# Patient Record
Sex: FEMALE | Race: WHITE | NOT HISPANIC OR LATINO | Employment: FULL TIME | URBAN - METROPOLITAN AREA
[De-identification: names, ages, dates, MRNs, and addresses within clinical notes are randomized per-mention and may not be internally consistent; named-entity substitution may affect disease eponyms.]

---

## 2018-04-28 ENCOUNTER — OFFICE VISIT (OUTPATIENT)
Dept: URGENT CARE | Facility: CLINIC | Age: 53
End: 2018-04-28
Payer: COMMERCIAL

## 2018-04-28 VITALS
TEMPERATURE: 98.2 F | BODY MASS INDEX: 44.98 KG/M2 | SYSTOLIC BLOOD PRESSURE: 140 MMHG | HEART RATE: 97 BPM | HEIGHT: 65 IN | OXYGEN SATURATION: 98 % | DIASTOLIC BLOOD PRESSURE: 73 MMHG | RESPIRATION RATE: 18 BRPM | WEIGHT: 270 LBS

## 2018-04-28 DIAGNOSIS — L08.9 FINGER INFECTION: Primary | ICD-10-CM

## 2018-04-28 PROCEDURE — 26010 DRAINAGE OF FINGER ABSCESS: CPT | Performed by: PHYSICIAN ASSISTANT

## 2018-04-28 PROCEDURE — 20610 DRAIN/INJ JOINT/BURSA W/O US: CPT | Performed by: PHYSICIAN ASSISTANT

## 2018-04-28 PROCEDURE — 99203 OFFICE O/P NEW LOW 30 MIN: CPT | Performed by: PHYSICIAN ASSISTANT

## 2018-04-28 RX ORDER — GINSENG 100 MG
1 CAPSULE ORAL ONCE
Status: COMPLETED | OUTPATIENT
Start: 2018-04-28 | End: 2018-04-28

## 2018-04-28 RX ORDER — SULFAMETHOXAZOLE AND TRIMETHOPRIM 800; 160 MG/1; MG/1
1 TABLET ORAL EVERY 12 HOURS SCHEDULED
Qty: 20 TABLET | Refills: 0 | Status: SHIPPED | OUTPATIENT
Start: 2018-04-28 | End: 2018-05-08

## 2018-04-28 RX ORDER — OMEPRAZOLE 20 MG/1
20 CAPSULE, DELAYED RELEASE ORAL DAILY
COMMUNITY

## 2018-04-28 RX ADMIN — Medication 1 SMALL APPLICATION: at 11:24

## 2018-04-28 NOTE — PROGRESS NOTES
[facility]        NAME: Anais Marcelo is a 46 y o  female  : 1965    MRN: [id number]  DATE: 2018  TIME: 11:42 AM    Assessment and Plan   Finger infection [L08 9]  1  Finger infection  bacitracin topical ointment 1 small application    sulfamethoxazole-trimethoprim (BACTRIM DS) 800-160 mg per tablet         Patient Instructions     1  Finger infection  -small abscess was drained with 18 gauge needle  -antibiotic ointment and bandaid was applied  -Do warm soaks 2-3 times a day  -Take Bactrim as directed  -Follow-up with PCP within 5-7 days    Go to ER with worsening symptoms, worsening pain, increased redness, fever, red streaking, or any new concerns      Chief Complaint     Chief Complaint   Patient presents with    Finger Injury     R 2nd digit, cut on 18, small cut from a knife, now painful, red         History of Present Illness       The patient presents today for an evaluation of right 2nd finger pain  The patient states that she cut her finger with a knife on 18  The patient states that it started scab over and kept picking at it  The patient states that on Thursday it started becoming more red and swollen  The patient rates her pain as a 0/10 while at rest but it becomes worse with touching and moving  The patient believes that her tetanus is up to date  Review of Systems   Review of Systems   Constitutional: Negative for chills and fever  Skin: Positive for wound  Neurological: Negative for numbness  Current Medications       Current Outpatient Prescriptions:     omeprazole (PriLOSEC) 20 mg delayed release capsule, Take 20 mg by mouth daily, Disp: , Rfl:     sulfamethoxazole-trimethoprim (BACTRIM DS) 800-160 mg per tablet, Take 1 tablet by mouth every 12 (twelve) hours for 10 days, Disp: 20 tablet, Rfl: 0  No current facility-administered medications for this visit       Current Allergies     Allergies as of 2018    (No Known Allergies) The following portions of the patient's history were reviewed and updated as appropriate: allergies, current medications, past family history, past medical history, past social history, past surgical history and problem list      No past medical history on file  No past surgical history on file  No family history on file  Medications have been verified  Objective   /73   Pulse 97   Temp 98 2 °F (36 8 °C) (Tympanic)   Resp 18   Ht 5' 5" (1 651 m)   Wt 122 kg (270 lb)   LMP  (LMP Unknown)   SpO2 98%   BMI 44 93 kg/m²        Physical Exam     Physical Exam   Constitutional: She is oriented to person, place, and time  She appears well-developed and well-nourished  Musculoskeletal: Normal range of motion  Arms:  Neurological: She is alert and oriented to person, place, and time  She has normal strength  No sensory deficit  Skin: Skin is warm and dry  Psychiatric: She has a normal mood and affect  Nursing note and vitals reviewed  Incision and drain  Date/Time: 4/28/2018 11:44 AM  Performed by: Giselle Quesada by: Glenroy Dawson     Patient location:  Clinic  Consent:     Consent obtained:  Verbal    Consent given by:  Patient  Location:     Type:  Abscess    Location:  Upper extremity    Upper extremity location:  R index finger  Procedure details:     Complexity:  Simple    Incision types:  Stab incision    Scalpel size: 18 gauge needle  Incision depth:  Skin    Drainage:  Purulent    Drainage amount:  Scant  Post-procedure details:     Patient tolerance of procedure:   Tolerated well, no immediate complications  Comments:      Antibiotic ointment and bandaid was applied

## 2018-04-28 NOTE — PATIENT INSTRUCTIONS
1  Finger infection  -small abscess was drained with 18 gauge needle  -antibiotic ointment and bandaid was applied  -Do warm soaks 2-3 times a day  -Take Bactrim as directed  -Follow-up with PCP within 5-7 days    Go to ER with worsening symptoms, worsening pain, increased redness, fever, red streaking, or any new concerns    Abscess   AMBULATORY CARE:   An abscess  is an area under the skin where pus (infected fluid) collects  An abscess is often caused by bacteria, fungi or other germs that get into an open wound  You can get an abscess anywhere on your body  Common signs and symptoms of an abscess: You may have a swollen mass that is red and painful  Pus may leak out of the mass  The pus will be white or yellow and may smell bad  You may have redness and pain days before the mass appears  You may have a fever and chills if the infection spreads  Seek immediate care if:   · The area around your abscess becomes very painful, warm, or has red streaks  · You have a fever and chills  · Your heart is beating faster than usual      · You feel faint or confused  Contact your healthcare provider if:   · Your abscess gets bigger or does not get better  · Your abscess returns  · You have questions or concerns about your condition or care  Treatment for an abscess: Your healthcare provider may need to make a cut in the abscess to allow the pus to drain  You may need surgery to remove your abscess  You may  need any of the following:  · Antibiotics  help treat a bacterial infection  · Acetaminophen  decreases pain and fever  It is available without a doctor's order  Ask how much to take and how often to take it  Follow directions  Acetaminophen can cause liver damage if not taken correctly  · NSAIDs , such as ibuprofen, help decrease swelling, pain, and fever  This medicine is available with or without a doctor's order  NSAIDs can cause stomach bleeding or kidney problems in certain people  If you take blood thinner medicine, always ask your healthcare provider if NSAIDs are safe for you  Always read the medicine label and follow directions  · Take your medicine as directed  Contact your healthcare provider if you think your medicine is not helping or if you have side effects  Tell him or her if you are allergic to any medicine  Keep a list of the medicines, vitamins, and herbs you take  Include the amounts, and when and why you take them  Bring the list or the pill bottles to follow-up visits  Carry your medicine list with you in case of an emergency  Self-care:   · Apply a warm compress to your abscess  This will help it open and drain  Wet a washcloth in warm, but not hot, water  Apply the compress for 10 minutes  Repeat this 4 times each day  Do not  press on an abscess or try to open it with a needle  You may push the bacteria deeper or into your blood  · Do not share your clothes, towels, or sheets with anyone  This can spread the infection to others  · Wash your hands often  This can help prevent the spread of germs  Use soap and water or an alcohol-based hand rub  Care for your wound after it is drained:   · Care for your wound as directed  If your healthcare provider says it is okay, carefully remove the bandage and gauze packing  You may need to soak the gauze to get it out of your wound  Clean your wound and the area around it as directed  Dry the area and put on new, clean bandages  Change your bandages when they get wet or dirty  · Ask your healthcare provider how to change the gauze in your wound  Keep track of how many pieces of gauze are placed inside the wound  Do not put too much packing in the wound  Do not pack the gauze too tightly in your wound  Follow up with your healthcare provider in 1 to 3 days: You may need to have your packing removed or your bandage changed  Write down your questions so you remember to ask them during your visits     © 2017 [facility] [address] is for End User's use only and may not be sold, redistributed or otherwise used for commercial purposes  All illustrations and images included in CareNotes® are the copyrighted property of [employer]  or Andrés Johnson  The above information is an  only  It is not intended as medical advice for individual conditions or treatments  Talk to your doctor, nurse or pharmacist before following any medical regimen to see if it is safe and effective for you

## 2021-04-08 DIAGNOSIS — Z23 ENCOUNTER FOR IMMUNIZATION: ICD-10-CM
